# Patient Record
Sex: MALE | Race: WHITE
[De-identification: names, ages, dates, MRNs, and addresses within clinical notes are randomized per-mention and may not be internally consistent; named-entity substitution may affect disease eponyms.]

---

## 2018-03-08 ENCOUNTER — HOSPITAL ENCOUNTER (EMERGENCY)
Dept: HOSPITAL 39 - ER | Age: 58
Discharge: ANOTHER HOSPITAL | End: 2018-03-08
Payer: SELF-PAY

## 2018-03-08 VITALS — OXYGEN SATURATION: 95 % | SYSTOLIC BLOOD PRESSURE: 172 MMHG | TEMPERATURE: 98.7 F | DIASTOLIC BLOOD PRESSURE: 101 MMHG

## 2018-03-08 DIAGNOSIS — I25.2: ICD-10-CM

## 2018-03-08 DIAGNOSIS — I21.19: Primary | ICD-10-CM

## 2018-03-08 DIAGNOSIS — E78.00: ICD-10-CM

## 2018-03-08 DIAGNOSIS — E11.9: ICD-10-CM

## 2018-03-08 DIAGNOSIS — Z79.82: ICD-10-CM

## 2018-03-08 PROCEDURE — 83880 ASSAY OF NATRIURETIC PEPTIDE: CPT

## 2018-03-08 PROCEDURE — 85730 THROMBOPLASTIN TIME PARTIAL: CPT

## 2018-03-08 PROCEDURE — 93005 ELECTROCARDIOGRAM TRACING: CPT

## 2018-03-08 PROCEDURE — 85025 COMPLETE CBC W/AUTO DIFF WBC: CPT

## 2018-03-08 PROCEDURE — 82553 CREATINE MB FRACTION: CPT

## 2018-03-08 PROCEDURE — 71045 X-RAY EXAM CHEST 1 VIEW: CPT

## 2018-03-08 PROCEDURE — 85610 PROTHROMBIN TIME: CPT

## 2018-03-08 PROCEDURE — 83735 ASSAY OF MAGNESIUM: CPT

## 2018-03-08 PROCEDURE — 36415 COLL VENOUS BLD VENIPUNCTURE: CPT

## 2018-03-08 PROCEDURE — 82550 ASSAY OF CK (CPK): CPT

## 2018-03-08 PROCEDURE — 80053 COMPREHEN METABOLIC PANEL: CPT

## 2018-03-08 PROCEDURE — 84484 ASSAY OF TROPONIN QUANT: CPT

## 2018-03-08 PROCEDURE — 85379 FIBRIN DEGRADATION QUANT: CPT

## 2018-03-08 NOTE — RAD
EXAM DESCRIPTION: Chest,1 View



CLINICAL HISTORY: chest pain 24 hours



COMPARISON: 8/20/2013



FINDINGS: Single frontal view of the chest.  The

cardiomediastinal silhouette has normal size and contour. No

consolidation, pneumothorax, or pleural effusion. Leads overlie

the chest. No displaced rib fractures identified. Low lung

volumes. Upper abdominal soft tissues are unremarkable.



IMPRESSION:



1. No acute pulmonary process identified.

 



Electronically signed by:  Jc Florez  3/8/2018 10:08 PM

[facility] Workstation: [id number]

## 2018-03-08 NOTE — ED.PDOC
History of Present Illness





- General


Chief Complaint: Chest Pain/MI


Stated Complaint: chest pain


Time Seen by Provider: 18 21:17


Source: patient


Exam Limitations: no limitations





- History of Present Illness


Initial Comments: 





the patient is a 57-year-old  male presenting to the emergency room 

secondary to chest pain and headache that started approximately 36 hours ago.  

He does have a history of having had a myocardial infarction couple of years 

ago that did resolve with lytic therapy.  He has not been on any antiplatelet 

therapy or any cholesterol medications.  The chest pain, little bit better last 

night until 10 AM this morning when he went back to work.  He has maximized at 

approximately a 6 out of 10 in pain. It has been steady since 10 AM this 

morning. Blood pressure is markedly elevated around 190 systolic and upon 

arrival here.  He is alert and oriented 4.  He is ambulatory.  He does not 

appear to be any distress.


Timing/Duration: 24 hours


Severity: moderate


Improving Factors: nothing


Worsening Factors: nothing


Associated Symptoms: chest pain


Allergies/Adverse Reactions: 


Allergies





Codeine Adverse Reaction (Verified 10/05/16 13:24)


 Other


 Causes hyperactivity 








Home Medications: 


Ambulatory Orders





Aspirin [Baby Aspirin] 81 mg PO DAILY 10/05/16 


Ciprofloxacin [Cipro] 250 mg PO BID #20 tab 10/05/16 


Famotidine 20 mg PO DAILY #30 tab 10/05/16 


Lovastatin 20 mg PO BEDTIME 10/05/16 


Metformin HCl 1,000 mg PO BID 10/05/16 


Ondansetron [Zofran Odt] 4 mg PO Q4H PRN #10 tab 10/05/16 











Review of Systems





- Review of Systems


Constitutional: States: no symptoms reported


EENTM: States: no symptoms reported


Respiratory: States: no symptoms reported


Cardiology: States: chest pain


Gastrointestinal/Abdominal: States: no symptoms reported


Genitourinary: States: no symptoms reported


Musculoskeletal: States: no symptoms reported


Skin: States: no symptoms reported


Neurological: States: no symptoms reported


Endocrine: States: no symptoms reported


All other Systems: No Change from Baseline





Past Medical History (General)





- Patient Medical History


Hx Stroke: No


Hx Cardiac Disorders: Yes - MI ; high cholesterol


Hx Congestive Heart Failure: No


Hx Diabetes: Yes


Hx MRSA: No


Surgical History: no surgical history





- Vaccination History


Hx Influenza Vaccination: No


Hx Pneumococcal Vaccination: No





- Social History


Hx Tobacco Use: Yes - Quit 





Family Medical History





- Family History


  ** Father


Living Status: 


Hx Family Cancer: Yes - mouth, throat





Physical Exam





- Physical Exam


General Appearance: Alert, Comfortable, No apparent distress


Eye Exam: bilateral normal


Ears, Nose, Throat: hearing grossly normal, normal ENT inspection, normal 

pharynx


Neck: full range of motion, supple


Respiratory: lungs clear, normal breath sounds, no respiratory distress, no 

accessory muscle use


Cardiovascular/Chest: normal peripheral pulses, regular rate, rhythm, no edema


Peripheral Pulses: radial,right: 2+, radial,left: 2+, dorsalis pedis,right: 2+, 

dorsalis pedis,left: 2+


Gastrointestinal/Abdominal: non tender, soft - obese


Rectal Exam: deferred


Back Exam: normal inspection, no CVA tenderness


Extremity: normal range of motion, non-tender, normal inspection, no pedal edema

, no calf tenderness, normal capillary refill


Neurologic: CNs II-XII nml as tested, alert, normal mood/affect, oriented x 3


Skin Exam: normal color


Comments: 





 Vital Signs - 24 hr











  18





  21:33


 


Temperature 99.4 F


 


Pulse Rate 106 H


 


Pulse Rate [ 106 H





monitor] 


 


Respiratory 22





Rate 


 


Blood Pressure 189/101





[left] 


 


O2 Sat by Pulse 96





Oximetry 














Progress





- Progress


Progress: 





18 22:32


the patient is a 57-year-old  male presenting with what appears to be 

an ST elevation MI.  He has been having pain greater than 12 hours however he 

is still having pain and there are still ST segment elevations.  The patient 

has refused air transport which will delay his arrival at a intervention 

facility by at least 1 hour.  The decision has been made to give lytics based 

on these findings.  The patient is receiving IV nitroglycerin to help reduce 

chest pain and reduce his blood pressure.  He has received an aspirin.  He is 

receiving IV heparin.  He is receiving oral Plavix.  All of this has been 

explained to the patient and all questions have been answered.  The patient 

will be transferred as soon as an ambulance is available for transfer to a 

higher level of care and the possibility of percutaneous intervention.  

Continue monitoring.  No significant hypotension withnitroglycerin so far.





Critical care time spent in treatment of this patient's condition and 

arrangement for his care is 45 minutes excluding otherwise billable procedures.





- Results/Orders


Results/Orders: 





 





18 21:38


Telemetry .CONTINUOUS 





18 21:45


EKG STAT 





18 22:00


Nitroglycerin/D5w IV 50,000 mcg   Premix Bottle 1 bottle IVS PRN 








 Laboratory Results - last 24 hr











  18





  21:45 21:45 21:45


 


WBC   11.5 H 


 


RBC   5.55 


 


Hgb   16.4 


 


Hct   48.9 


 


MCV   88.1 


 


MCH   29.5 


 


MCHC   33.4 


 


RDW   14.3 


 


Plt Count   232 


 


MPV   7.5 


 


Absolute Neuts (auto)   8.30 H 


 


Absolute Lymphs (auto)   2.20 


 


Absolute Monos (auto)   0.70 


 


Absolute Eos (auto)   0.20 


 


Absolute Basos (auto)   0.10 


 


Neutrophils %   72.4 


 


Lymphocytes %   18.9 L 


 


Monocytes %   6.4 


 


Eosinophils %   1.3 


 


Basophils %   1.0 


 


PT    10.9


 


INR    0.960


 


PTT (SP)    32.9


 


D-Dimer, Quantitative    < 230


 


Sodium  138  


 


Potassium  3.8  


 


Chloride  100 L  


 


Carbon Dioxide  28  


 


Anion Gap  13.8  


 


BUN  15  


 


Creatinine  0.95  


 


BUN/Creatinine Ratio  15.8  


 


Random Glucose  185 H  


 


Serum Osmolality  281.3  


 


Calcium  9.2  


 


Magnesium  1.9  


 


Total Bilirubin  0.6  


 


AST  22  


 


ALT  16  


 


Alkaline Phosphatase  60  


 


Creatine Kinase  320 H*  


 


CK-MB (CK-2)  15.0 H*  


 


CK-MB (CK-2) %  4.69 H  


 


Troponin I  0.48 H*  


 


B-Natriuretic Peptide  25.0  


 


Serum Total Protein  7.7  


 


Albumin  4.2  


 


Globulin  3.5  


 


Albumin/Globulin Ratio  1.2  








chest x-ray shows no acute changes.


Initial and repeat EKG show approximately 1 mm ST segment elevation in inferior 

leads and once millimeter ST segment depression in aVL.  Otherwise EKG appears 

consistent with previous EKGs.  Rate is borderline normal sinus rhythm to mild 

sinus tachycardia. QT interval is around 450 ms.





Departure





- Departure


Clinical Impression: 


ST elevation (STEMI) myocardial infarction


Qualifiers:


 Involved coronary artery: other inferior wall coronary artery Qualified Code(s)

: I21.19 - ST elevation (STEMI) myocardial infarction involving other coronary 

artery of inferior wall





Disposition: Transfer to Hospital


Referrals: 


Julee Hickey NP [Primary Care Provider] - 1-2 Weeks


Home Medications: 


Ambulatory Orders





Aspirin [Baby Aspirin] 81 mg PO DAILY 10/05/16 


Ciprofloxacin [Cipro] 250 mg PO BID #20 tab 10/05/16 


Famotidine 20 mg PO DAILY #30 tab 10/05/16 


Lovastatin 20 mg PO BEDTIME 10/05/16 


Metformin HCl 1,000 mg PO BID 10/05/16 


Ondansetron [Zofran Odt] 4 mg PO Q4H PRN #10 tab 10/05/16 











Transfer to Outside Facility





- Transfer Information


Accepting Provider:: dr weber


Accepting Facility: [facility]


Reason for Transfer: required specialist not available

## 2020-06-29 ENCOUNTER — HOSPITAL ENCOUNTER (OUTPATIENT)
Dept: HOSPITAL 39 - YCFC.O | Age: 60
End: 2020-06-29
Attending: FAMILY MEDICINE
Payer: COMMERCIAL

## 2020-06-29 DIAGNOSIS — Z20.828: Primary | ICD-10-CM

## 2021-01-15 ENCOUNTER — HOSPITAL ENCOUNTER (OUTPATIENT)
Dept: HOSPITAL 39 - YCFC.O | Age: 61
End: 2021-01-15
Attending: NURSE PRACTITIONER
Payer: COMMERCIAL

## 2021-01-15 DIAGNOSIS — E11.9: ICD-10-CM

## 2021-01-15 DIAGNOSIS — I10: Primary | ICD-10-CM
